# Patient Record
Sex: MALE | Race: WHITE | NOT HISPANIC OR LATINO | Employment: FULL TIME | URBAN - METROPOLITAN AREA
[De-identification: names, ages, dates, MRNs, and addresses within clinical notes are randomized per-mention and may not be internally consistent; named-entity substitution may affect disease eponyms.]

---

## 2021-04-05 ENCOUNTER — HOSPITAL ENCOUNTER (EMERGENCY)
Facility: CLINIC | Age: 51
Discharge: HOME | End: 2021-04-06
Attending: EMERGENCY MEDICINE | Admitting: EMERGENCY MEDICINE
Payer: COMMERCIAL

## 2021-04-05 ENCOUNTER — APPOINTMENT (OUTPATIENT)
Dept: GENERAL RADIOLOGY | Facility: CLINIC | Age: 51
End: 2021-04-05
Attending: EMERGENCY MEDICINE
Payer: COMMERCIAL

## 2021-04-05 DIAGNOSIS — U07.1 PNEUMONIA DUE TO 2019 NOVEL CORONAVIRUS: ICD-10-CM

## 2021-04-05 DIAGNOSIS — J12.82 PNEUMONIA DUE TO 2019 NOVEL CORONAVIRUS: ICD-10-CM

## 2021-04-05 DIAGNOSIS — R03.0 ELEVATED BLOOD PRESSURE READING WITHOUT DIAGNOSIS OF HYPERTENSION: ICD-10-CM

## 2021-04-05 LAB
ANION GAP SERPL CALCULATED.3IONS-SCNC: 2 MMOL/L (ref 3–14)
BASOPHILS # BLD AUTO: 0 10E9/L (ref 0–0.2)
BASOPHILS NFR BLD AUTO: 0.5 %
BUN SERPL-MCNC: 19 MG/DL (ref 7–30)
CALCIUM SERPL-MCNC: 8.7 MG/DL (ref 8.5–10.1)
CHLORIDE SERPL-SCNC: 104 MMOL/L (ref 94–109)
CO2 SERPL-SCNC: 30 MMOL/L (ref 20–32)
CREAT SERPL-MCNC: 1.21 MG/DL (ref 0.66–1.25)
DIFFERENTIAL METHOD BLD: ABNORMAL
EOSINOPHIL # BLD AUTO: 0 10E9/L (ref 0–0.7)
EOSINOPHIL NFR BLD AUTO: 0.3 %
ERYTHROCYTE [DISTWIDTH] IN BLOOD BY AUTOMATED COUNT: 12.5 % (ref 10–15)
GFR SERPL CREATININE-BSD FRML MDRD: 69 ML/MIN/{1.73_M2}
GLUCOSE SERPL-MCNC: 106 MG/DL (ref 70–99)
HCT VFR BLD AUTO: 47.5 % (ref 40–53)
HGB BLD-MCNC: 15.1 G/DL (ref 13.3–17.7)
IMM GRANULOCYTES # BLD: 0 10E9/L (ref 0–0.4)
IMM GRANULOCYTES NFR BLD: 0.3 %
LYMPHOCYTES # BLD AUTO: 1.3 10E9/L (ref 0.8–5.3)
LYMPHOCYTES NFR BLD AUTO: 32 %
MCH RBC QN AUTO: 28.6 PG (ref 26.5–33)
MCHC RBC AUTO-ENTMCNC: 31.8 G/DL (ref 31.5–36.5)
MCV RBC AUTO: 90 FL (ref 78–100)
MONOCYTES # BLD AUTO: 0.5 10E9/L (ref 0–1.3)
MONOCYTES NFR BLD AUTO: 12.7 %
NEUTROPHILS # BLD AUTO: 2.1 10E9/L (ref 1.6–8.3)
NEUTROPHILS NFR BLD AUTO: 54.2 %
NRBC # BLD AUTO: 0 10*3/UL
NRBC BLD AUTO-RTO: 0 /100
PLATELET # BLD AUTO: 151 10E9/L (ref 150–450)
POTASSIUM SERPL-SCNC: 4.2 MMOL/L (ref 3.4–5.3)
RBC # BLD AUTO: 5.28 10E12/L (ref 4.4–5.9)
SODIUM SERPL-SCNC: 136 MMOL/L (ref 133–144)
WBC # BLD AUTO: 3.9 10E9/L (ref 4–11)

## 2021-04-05 PROCEDURE — 96361 HYDRATE IV INFUSION ADD-ON: CPT

## 2021-04-05 PROCEDURE — 258N000003 HC RX IP 258 OP 636: Performed by: EMERGENCY MEDICINE

## 2021-04-05 PROCEDURE — 85025 COMPLETE CBC W/AUTO DIFF WBC: CPT | Performed by: EMERGENCY MEDICINE

## 2021-04-05 PROCEDURE — 71045 X-RAY EXAM CHEST 1 VIEW: CPT

## 2021-04-05 PROCEDURE — 250N000013 HC RX MED GY IP 250 OP 250 PS 637: Performed by: EMERGENCY MEDICINE

## 2021-04-05 PROCEDURE — 96374 THER/PROPH/DIAG INJ IV PUSH: CPT

## 2021-04-05 PROCEDURE — 99284 EMERGENCY DEPT VISIT MOD MDM: CPT | Mod: 25

## 2021-04-05 PROCEDURE — 80048 BASIC METABOLIC PNL TOTAL CA: CPT | Performed by: EMERGENCY MEDICINE

## 2021-04-05 PROCEDURE — 250N000011 HC RX IP 250 OP 636: Performed by: EMERGENCY MEDICINE

## 2021-04-05 PROCEDURE — 87636 SARSCOV2 & INF A&B AMP PRB: CPT | Performed by: EMERGENCY MEDICINE

## 2021-04-05 PROCEDURE — C9803 HOPD COVID-19 SPEC COLLECT: HCPCS

## 2021-04-05 RX ORDER — KETOROLAC TROMETHAMINE 15 MG/ML
15 INJECTION, SOLUTION INTRAMUSCULAR; INTRAVENOUS ONCE
Status: COMPLETED | OUTPATIENT
Start: 2021-04-05 | End: 2021-04-05

## 2021-04-05 RX ORDER — ACETAMINOPHEN 500 MG
1000 TABLET ORAL ONCE
Status: COMPLETED | OUTPATIENT
Start: 2021-04-05 | End: 2021-04-05

## 2021-04-05 RX ADMIN — KETOROLAC TROMETHAMINE 15 MG: 15 INJECTION, SOLUTION INTRAMUSCULAR; INTRAVENOUS at 23:57

## 2021-04-05 RX ADMIN — ACETAMINOPHEN 1000 MG: 500 TABLET, FILM COATED ORAL at 23:57

## 2021-04-05 RX ADMIN — SODIUM CHLORIDE 1000 ML: 9 INJECTION, SOLUTION INTRAVENOUS at 23:55

## 2021-04-06 VITALS
RESPIRATION RATE: 18 BRPM | OXYGEN SATURATION: 96 % | SYSTOLIC BLOOD PRESSURE: 155 MMHG | HEART RATE: 97 BPM | TEMPERATURE: 98.6 F | DIASTOLIC BLOOD PRESSURE: 107 MMHG

## 2021-04-06 LAB
FLUAV RNA RESP QL NAA+PROBE: NEGATIVE
FLUBV RNA RESP QL NAA+PROBE: NEGATIVE
LABORATORY COMMENT REPORT: ABNORMAL
RSV RNA SPEC QL NAA+PROBE: ABNORMAL
SARS-COV-2 RNA RESP QL NAA+PROBE: POSITIVE
SPECIMEN SOURCE: ABNORMAL

## 2021-04-06 ASSESSMENT — ENCOUNTER SYMPTOMS
DIARRHEA: 0
MYALGIAS: 1
VOMITING: 0
FEVER: 1
COUGH: 1
SHORTNESS OF BREATH: 1
APPETITE CHANGE: 1

## 2021-04-06 NOTE — DISCHARGE INSTRUCTIONS
Discharge Instructions  COVID-19    COVID-19 is the disease caused by a new coronavirus. The virus spreads from person-to-person primarily by droplets when an infected person coughs or sneezes and the droplet either lands on another person or that other person touches a surface with the droplet on it. There are tests available to diagnose COVID-19. There is no specific treatment or medicine for the disease.    You may have been diagnosed with COVID, may be being tested for COVID and have a pending test result, or may have been exposed to COVID.    Symptoms of COVID-19    Many people have no symptoms or mild symptoms.  Symptoms may usually appear 4 to 5 days (up to 14 days) after contact with a person with COVID-19. Some people will get severe symptoms and pneumonia. Usual symptoms are:     ? Fever  ? Cough  ? Trouble breathing    Less common symptoms are: Headache, body aches, sore throat, sneezing, diarrhea, loss of taste or smell.    Isolation and Quarantine    You were seen because you have symptoms, had an exposure, or had some other concern about possible COVID. The best way to stop the spread of the virus is to avoid contact with others.  Isolation refers to sick people staying away from people who are not sick. A person in quarantine is limiting activity because they were exposed and are waiting to see if they might become sick.    If you test positive for COVID, you should stay home (isolation) for at least 10 days after your symptoms began, and for 24 hours with no fever and improvement of symptoms--whichever is longer. (Your fever should be gone for 24 hours without using fever-reducing medicine). If you have no symptoms, you should stay home (isolation) for 10 days from the day of the test.    For example, if you have a fever and cough for 6 days, you need to stay home 4 more days with no fever for a total of 10 days. Or, if you have a fever and cough for 10 days, you need to stay home one more day with  no fever for a total of 11 days.    If you have a high-risk exposure to COVID (you spent 15 minutes or more within six feet of somebody who has COVID), you should stay home (quarantine) for 14 days. Even if you test negative for COVID, the CDC recommends a 14-day quarantine from the time of your last exposure to that individual. There are options for a shortened (<14 day quarantine) you can review at:    https://www.health.[facility].mn./diseases/coronavirus/close.html#long    If you have symptoms but a negative test, you should stay at home until you are symptom-free and without fever for 24 hours, using the same judgment you would for when it is safe to return to work/school from strep throat, influenza, or the common cold. If you worsen, you should consider being re-evaluated.    If you are being tested for COVID and your test is pending, you should stay home until you know your test result.    How should I protect myself and others?    Do not go to work or school. Have a friend or relative do your shopping. Do not use public transportation (bus, train) or ridesharing (Lyft, Uber).    Separate yourself from other people in your home. As much as possible, you should stay in one room and away from other people in your home. Also, use a separate bathroom, if possible. Avoid handling pets or other animals while sick.     Wear a facemask if you need to be around other people and cover your mouth and nose with a tissue when you cough or sneeze.     Avoid sharing personal household items. You should not share dishes, drinking glasses, forks/knives/spoons, towels, or bedding with other people in your home. After using these items, they should be washed with soap and water. Clean parts of your home that are touched often (doorknobs, faucets, countertops, etc.) daily.     Wash your hands often with soap and water for at least 20 seconds or use an alcohol-based hand  containing at least 60% alcohol.     Avoid touching  your face.    Treat your symptoms. You can take Acetaminophen (Tylenol) to treat body aches and fever as needed for comfort. Ibuprofen (Advil or Motrin) can be used as well if you still have symptoms after taking Tylenol. Drink fluids. Rest.    Watch for worsening symptoms such as shortness of breath/difficulty breathing or very severe weakness.    Employers/workplaces are being asked by the Centers for Disease Control (CDC) to not request notes/documentation for you to return to work or prove that you were ill. You may choose to show your employer this paperwork. Also, repeat testing should not be required to return to work.    Exercise/Sports in rare cases, COVID could affect your heart in a way that makes exercise or participation in sports dangerous.  If you have a mild COVID illness (fever, cough, sore throat, and similar symptoms but no difficulty breathing or abnormalities of the lung): After your COVID symptoms have resolved, wait 14-days before returning to activity.  If you have more than a mild illness (meaning that you have problems with your breathing or lungs) or if you participate in competitive or strenuous activity or have a history of heart disease: Please see your primary doctor/provider prior to return to activity/competition.    Return to the Emergency Department if:    If you are developing worsening breathing, shortness of breath, or feel worse you should seek medical attention.  If you are uncertain, contact your health care provider/clinic. If you need emergency medical attention, call 911 and tell them you have been ill.     Discharge Instructions  Hypertension - High Blood Pressure    During you visit to the Emergency Department, your blood pressure was higher than the recommended blood pressure.  This may be related to stress, pain, medication or other temporary conditions. In these cases, your blood pressure may return to normal on its own. If you have a history of high blood pressure,  you may need to have your provider adjust your medications. Sometimes, your high measurement here may indicate that you have developed high blood pressure that will stay high unless it is treated. As a general rule, high blood pressure causes problems over years rather than days, weeks, or months. So, while it is important to treat blood pressure, it is rarely important to treat blood pressure immediately. Occasionally we will begin a medication in the Emergency Department; more often we will recommend close follow-up for medications with a primary doctor/clinic.    Generally, every Emergency Department visit should have a follow-up clinic visit with either a primary or a specialty clinic/provider. Please follow-up as instructed by your emergency provider today.    Return to the Emergency Department if you start to have:  A severe headache.  Chest pain.  Shortness of breath.  Weakness or numbness that affects one part of the body.  Confusion.  Vision changes.  Significant swelling of legs and/or eyes.  A reaction to any medication started in the Emergency Department.    What can I do to help myself?  Avoid alcohol.  Take any blood pressure medicine that you are prescribed.  Get a good night s sleep.  Lower your salt intake.  Exercise.  Lose weight.  Manage stress.  See your doctor regularly    If blood pressure medication was started in the Emergency Department:  The medicine may not have an immediate effect. The body and brain determine what blood pressure you have. The medicine s job is to retrain the body s  thermostat  to a lower blood pressure.  You will need to follow up with your provider to see how this medicine is working for you.  If you were given a prescription for medicine here today, be sure to read all of the information (including the package insert) that comes with your prescription.  This will include important information about the medicine, its side effects, and any warnings that you need to know  about.  The pharmacist who fills the prescription can provide more information and answer questions you may have about the medicine.  If you have questions or concerns that the pharmacist cannot address, please call or return to the Emergency Department.   Remember that you can always come back to the Emergency Department if you are not able to see your regular provider in the amount of time listed above, if you get any new symptoms, or if there is anything that worries you.

## 2021-04-06 NOTE — ED PROVIDER NOTES
History   Chief Complaint:  Fever       The history is provided by the patient.      Jassi Bernardo is a 51 year old male healthy at baseline who presents with concerns for fever, cough and shortness of breath in the setting of exposure to multiple household members with COVID-19. He reports his symptoms began about one week ago with congestion. He then developed fevers up to 103F, cough, shortness of breath, body aches. He notes he has been drinking fluids but feels dehydrated. He has not eaten as much as typical. He denies vomiting or diarrhea. He denies chest pain but notes it feels like it is pounding fast, noting that despite that sensation his apple watch shows his heart rate in the 80s. He last took tylenol at 5pm. He took ibuprofen at 3 pm.      Review of Systems   Constitutional: Positive for appetite change and fever.   HENT: Positive for congestion.    Respiratory: Positive for cough and shortness of breath.    Cardiovascular: Negative for chest pain.   Gastrointestinal: Negative for diarrhea and vomiting.   Musculoskeletal: Positive for myalgias.   All other systems reviewed and are negative.      Allergies:  The patient has no known allergies.     Medications:  The patient is currently on no regular medications.    Past Medical History:    Amblyopia  Anxiety     Family History:    Father: colitis    Social History:  Patient presents to the ED with his wife.  Nonsmoker    Physical Exam     Patient Vitals for the past 24 hrs:   BP Temp Temp src Pulse Resp SpO2   04/06/21 0046 (!) 155/107 -- -- -- -- 96 %   04/05/21 1949 (!) 191/108 98.6  F (37  C) Oral 97 18 96 %       Physical Exam  General: Adult male sitting upright  Eyes: PERRL, Conjunctive within normal limits  ENT: Moist mucous membranes, oropharynx clear.   CV: Normal S1S2, no murmur, rub or gallop. Regular rate and rhythm  Resp: Clear to auscultation bilaterally, no wheezes, rales or rhonchi. Normal respiratory effort.  GI: Abdomen is soft,  nontender and nondistended. No palpable masses. No rebound or guarding.  MSK: Trace edema at the sockline bilateral lower extremities. Nontender. Normal active range of motion.  Skin: Warm and dry. No rashes or lesions or ecchymoses on visible skin.  Neuro: Alert and oriented. Responds appropriately to all questions and commands. No focal findings appreciated. Normal muscle tone.  Psych: Normal mood and affect. Pleasant.    Emergency Department Course     Imaging:  XR Chest 1 View  Patchy infiltrates of both lungs with upper lobe predominance consistent with bilateral pneumonia. No pleural fluid or pneumothorax. Normal heart size and pulmonary vascularity.  Reading per radiology.     Laboratory:  CBC: WBC: 3.9 (L), HGB: 15.1, PLT: 151    BMP: Glucose 106 (H), Anion Gap: 2 (L), o/w WNL (Creatinine: 1.21)    Symptomatic Influenza A/B antigen & COVID-19 PCR: Positive    Emergency Department Course:    Reviewed:  I reviewed nursing notes, vitals, past medical history and care everywhere    Assessments:  2325 I obtained history and examined the patient as noted above.   0037 I rechecked the patient. Patient is feeling improved.     Interventions:  2355 NS 1L IV  2357 Tylenol 1000 mg PO  2357 Toradol 15 mg IV    Disposition:  The patient was discharged to home.       Impression & Plan     CMS Diagnoses: None    Medical Decision Making:  Jassi Bernardo is a 51 year old male presents with symptoms that are concerning for COVID-19.  The patient is not hypoxic.  His blood pressure is elevated without known diagnosis of hypertension.  He did improve slightly over time but was still elevated.  He is aware of this and the need for follow-up with his PCP for further discussion.  He has evidence of Covid pneumonia on x-ray with a positive Covid test here.  I do not suspect bacterial cause for symptoms today.  There is no indication for antibiotics.  He did not exhibit any extra work of breathing, had normal mentation, and I do  not believe that the patient requires hospitalization at this time.  He had no hypoxia with ambulation.  The patient does not currently meet criteria for hospitalization.  Patient given [facility] guidelines for home isolation and will follow up with PCP. Patient will drink plenty of fluids, take anti-pyretics and OTC decongestants as needed. Patient asked to return for severe difficulty breathing or new onset chest pain or other worrisome concerns.     Covid-19  Jassi Bernardo was evaluated during a global COVID-19 pandemic, which necessitated consideration that the patient might be at risk for infection with the SARS-CoV-2 virus that causes COVID-19.   Applicable protocols for evaluation were followed during the patient's care.   COVID-19 was considered as part of the patient's evaluation. The plan for testing is:  a test was obtained during this visit.      Diagnosis:    ICD-10-CM    1. Pneumonia due to 2019 novel coronavirus  U07.1     J12.82    2. Elevated blood pressure reading without diagnosis of hypertension  R03.0        Scribe Disclosure:  I, Torie Villalobos, am serving as a scribe at 11:35 PM on 4/5/2021 to document services personally performed by Sylvia Diaz MD based on my observations and the provider's statements to me.            Sylvia Diaz MD  04/06/21 0413       Sylvia Diaz MD  04/06/21 0413

## 2021-04-06 NOTE — ED TRIAGE NOTES
Here for fever(100F-103F) ongoing for past 5 days associated with chills and muscle aches. Today, developed coughing, palpitations, sob, and extremities tingling sensation. Wife and child tested positive for covid. Last took tylenol at 5pm. ABCs intact.

## 2021-04-07 ENCOUNTER — HOSPITAL ENCOUNTER (EMERGENCY)
Facility: CLINIC | Age: 51
Discharge: OTHER INSTITUTION | End: 2021-04-08
Attending: EMERGENCY MEDICINE | Admitting: EMERGENCY MEDICINE
Payer: COMMERCIAL

## 2021-04-07 ENCOUNTER — APPOINTMENT (OUTPATIENT)
Dept: GENERAL RADIOLOGY | Facility: CLINIC | Age: 51
End: 2021-04-07
Attending: EMERGENCY MEDICINE
Payer: COMMERCIAL

## 2021-04-07 ENCOUNTER — PATIENT OUTREACH (OUTPATIENT)
Dept: CARE COORDINATION | Facility: CLINIC | Age: 51
End: 2021-04-07

## 2021-04-07 DIAGNOSIS — J12.82 PNEUMONIA DUE TO 2019 NOVEL CORONAVIRUS: ICD-10-CM

## 2021-04-07 DIAGNOSIS — U07.1 PNEUMONIA DUE TO 2019 NOVEL CORONAVIRUS: ICD-10-CM

## 2021-04-07 DIAGNOSIS — U07.1 2019 NOVEL CORONAVIRUS DISEASE (COVID-19): Primary | ICD-10-CM

## 2021-04-07 LAB
ALBUMIN SERPL-MCNC: 3.3 G/DL (ref 3.4–5)
ALP SERPL-CCNC: 60 U/L (ref 40–150)
ALT SERPL W P-5'-P-CCNC: 98 U/L (ref 0–70)
ANION GAP SERPL CALCULATED.3IONS-SCNC: 4 MMOL/L (ref 3–14)
AST SERPL W P-5'-P-CCNC: 105 U/L (ref 0–45)
BASE EXCESS BLDV CALC-SCNC: 5.1 MMOL/L
BASOPHILS # BLD AUTO: 0 10E9/L (ref 0–0.2)
BASOPHILS NFR BLD AUTO: 0.4 %
BILIRUB SERPL-MCNC: 0.3 MG/DL (ref 0.2–1.3)
BUN SERPL-MCNC: 13 MG/DL (ref 7–30)
CALCIUM SERPL-MCNC: 9.3 MG/DL (ref 8.5–10.1)
CHLORIDE SERPL-SCNC: 100 MMOL/L (ref 94–109)
CO2 SERPL-SCNC: 29 MMOL/L (ref 20–32)
CREAT SERPL-MCNC: 1.06 MG/DL (ref 0.66–1.25)
DIFFERENTIAL METHOD BLD: NORMAL
EOSINOPHIL # BLD AUTO: 0 10E9/L (ref 0–0.7)
EOSINOPHIL NFR BLD AUTO: 0 %
ERYTHROCYTE [DISTWIDTH] IN BLOOD BY AUTOMATED COUNT: 12.3 % (ref 10–15)
GFR SERPL CREATININE-BSD FRML MDRD: 81 ML/MIN/{1.73_M2}
GLUCOSE SERPL-MCNC: 105 MG/DL (ref 70–99)
HCO3 BLDV-SCNC: 30 MMOL/L (ref 21–28)
HCT VFR BLD AUTO: 46.8 % (ref 40–53)
HGB BLD-MCNC: 15.7 G/DL (ref 13.3–17.7)
IMM GRANULOCYTES # BLD: 0 10E9/L (ref 0–0.4)
IMM GRANULOCYTES NFR BLD: 0.4 %
LACTATE BLD-SCNC: 0.4 MMOL/L (ref 0.7–2)
LYMPHOCYTES # BLD AUTO: 0.8 10E9/L (ref 0.8–5.3)
LYMPHOCYTES NFR BLD AUTO: 15.9 %
MAGNESIUM SERPL-MCNC: 1.8 MG/DL (ref 1.6–2.3)
MCH RBC QN AUTO: 28.8 PG (ref 26.5–33)
MCHC RBC AUTO-ENTMCNC: 33.5 G/DL (ref 31.5–36.5)
MCV RBC AUTO: 86 FL (ref 78–100)
MONOCYTES # BLD AUTO: 0.3 10E9/L (ref 0–1.3)
MONOCYTES NFR BLD AUTO: 6.9 %
NEUTROPHILS # BLD AUTO: 3.8 10E9/L (ref 1.6–8.3)
NEUTROPHILS NFR BLD AUTO: 76.4 %
NRBC # BLD AUTO: 0 10*3/UL
NRBC BLD AUTO-RTO: 0 /100
O2/TOTAL GAS SETTING VFR VENT: ABNORMAL %
OXYHGB MFR BLDV: 50 %
PCO2 BLDV: 45 MM HG (ref 40–50)
PH BLDV: 7.44 PH (ref 7.32–7.43)
PLATELET # BLD AUTO: 170 10E9/L (ref 150–450)
PO2 BLDV: 26 MM HG (ref 25–47)
POTASSIUM SERPL-SCNC: 4.6 MMOL/L (ref 3.4–5.3)
PROT SERPL-MCNC: 7.9 G/DL (ref 6.8–8.8)
RBC # BLD AUTO: 5.46 10E12/L (ref 4.4–5.9)
SODIUM SERPL-SCNC: 133 MMOL/L (ref 133–144)
TROPONIN I SERPL-MCNC: <0.015 UG/L (ref 0–0.04)
WBC # BLD AUTO: 4.9 10E9/L (ref 4–11)

## 2021-04-07 PROCEDURE — 99285 EMERGENCY DEPT VISIT HI MDM: CPT | Mod: 25

## 2021-04-07 PROCEDURE — 96361 HYDRATE IV INFUSION ADD-ON: CPT

## 2021-04-07 PROCEDURE — 258N000003 HC RX IP 258 OP 636: Performed by: EMERGENCY MEDICINE

## 2021-04-07 PROCEDURE — 80053 COMPREHEN METABOLIC PANEL: CPT | Performed by: EMERGENCY MEDICINE

## 2021-04-07 PROCEDURE — 82805 BLOOD GASES W/O2 SATURATION: CPT | Performed by: EMERGENCY MEDICINE

## 2021-04-07 PROCEDURE — 71045 X-RAY EXAM CHEST 1 VIEW: CPT

## 2021-04-07 PROCEDURE — 84484 ASSAY OF TROPONIN QUANT: CPT | Performed by: EMERGENCY MEDICINE

## 2021-04-07 PROCEDURE — 85025 COMPLETE CBC W/AUTO DIFF WBC: CPT | Performed by: EMERGENCY MEDICINE

## 2021-04-07 PROCEDURE — 83605 ASSAY OF LACTIC ACID: CPT | Performed by: EMERGENCY MEDICINE

## 2021-04-07 PROCEDURE — 96374 THER/PROPH/DIAG INJ IV PUSH: CPT

## 2021-04-07 PROCEDURE — 250N000011 HC RX IP 250 OP 636: Performed by: EMERGENCY MEDICINE

## 2021-04-07 PROCEDURE — 83735 ASSAY OF MAGNESIUM: CPT | Performed by: EMERGENCY MEDICINE

## 2021-04-07 PROCEDURE — 93005 ELECTROCARDIOGRAM TRACING: CPT

## 2021-04-07 RX ORDER — DEXAMETHASONE SODIUM PHOSPHATE 10 MG/ML
10 INJECTION, SOLUTION INTRAMUSCULAR; INTRAVENOUS ONCE
Status: COMPLETED | OUTPATIENT
Start: 2021-04-07 | End: 2021-04-07

## 2021-04-07 RX ADMIN — SODIUM CHLORIDE 1000 ML: 9 INJECTION, SOLUTION INTRAVENOUS at 21:34

## 2021-04-07 RX ADMIN — DEXAMETHASONE SODIUM PHOSPHATE 10 MG: 10 INJECTION, SOLUTION INTRAMUSCULAR; INTRAVENOUS at 22:12

## 2021-04-07 ASSESSMENT — ENCOUNTER SYMPTOMS
NAUSEA: 1
FEVER: 0
MYALGIAS: 1
SHORTNESS OF BREATH: 1
COUGH: 1

## 2021-04-07 NOTE — PROGRESS NOTES
Clinic Care Coordination Contact  [facility]/Voicemail       Clinical Data: Care Coordinator Outreach  Outreach attempted x 1.  Left message on patient's voicemail with call back information and requested return call.  Plan: Care Coordinator will try to reach patient again in 1-2 business days.

## 2021-04-08 ENCOUNTER — PATIENT OUTREACH (OUTPATIENT)
Dept: CARE COORDINATION | Facility: CLINIC | Age: 51
End: 2021-04-08

## 2021-04-08 VITALS
OXYGEN SATURATION: 96 % | TEMPERATURE: 98.4 F | DIASTOLIC BLOOD PRESSURE: 119 MMHG | SYSTOLIC BLOOD PRESSURE: 179 MMHG | HEART RATE: 86 BPM | RESPIRATION RATE: 23 BRPM

## 2021-04-08 LAB — INTERPRETATION ECG - MUSE: NORMAL

## 2021-04-08 NOTE — PROGRESS NOTES
Clinic Care Coordination Contact  Community Health Worker Initial Outreach       The CHW reviewed the chart and the patient is in patient at [facility] at this time. The CHW will monitor the chart with the [facility] system for a discharge.

## 2021-04-08 NOTE — ED TRIAGE NOTES
Pt arrives via EMS from home for evaluation of respiratory distress r/t covid. Pt seen yesterday dx with covid. Pt having difficulty breathing, body aches, abdominal pain and hypertension. Pt 85% on room air upon arrival. Tylenol last at 2000, Ibuprofen at 1900.

## 2021-04-08 NOTE — ED PROVIDER NOTES
"  History   Chief Complaint:  Shortness of Breath    The history is provided by the patient and medical records.     Jassi Bernardo is a 51 year old male who presents via EMS for evaluation of worsening shortness of breath in the setting of COVID-19. His family tested positive for COVID-19 approximately 1.5 weeks ago; he was asymptomatic and not tested. One week ago, however, he reports starting to develop congestion and sinus concerns, consistent with his typical \"head cold\". These symptoms progressed and he also developed fevers (T-max 103 F), myalgias, cough, palpitations, and dyspnea. Two days ago, he presented to the ED for evaluation of his worsening symptoms; there, he tested positive for COVID, but his work up was otherwise reassuring, he maintained oxygen levels of 96%, and he was deemed stable for discharge. Since then, he has continued use of Tylenol and ibuprofen at home (last dose of Tylenol at 2000) and his fevers have broken. However, his dyspnea has only worsened. Tonight, his breathing was so bad that 911 was called to his home. On arrival, he was found to be hypoxic to the 80s. Here, he continues to report the generalized body aches, dyspnea, and nausea.     From ED Encounter - 4/5/21:   XR Chest 1 View  Patchy infiltrates of both lungs with upper lobe predominance consistent with bilateral pneumonia. No pleural fluid or pneumothorax. Normal heart size and pulmonary vascularity. Reading per radiology.      CBC: WBC: 3.9 (L), HGB: 15.1, PLT: 151  BMP: Glucose 106 (H), Anion Gap: 2 (L), o/w WNL (Creatinine: 1.21)  Symptomatic Influenza A/B antigen & COVID-19 PCR: Positive for COVID-19    Allergies:  No Known Allergies    Medications:    The patient is currently on no regular medications.    Past Medical History:    Anxiety   Hypertension    Past Surgical History:    He denies past surgical history.     Family History:    Father: Colitis     Social History:  Presents with EMS   He is . "     Review of Systems   Constitutional: Negative for fever.   Respiratory: Positive for cough and shortness of breath.    Cardiovascular: Negative for chest pain.   Gastrointestinal: Positive for nausea.   Musculoskeletal: Positive for myalgias.   All other systems reviewed and are negative.    Physical Exam     Patient Vitals for the past 24 hrs:   BP Temp Temp src Pulse Resp SpO2   04/07/21 2310 -- -- -- 105 28 95 %   04/07/21 2305 -- -- -- 94 24 95 %   04/07/21 2300 (!) 176/111 -- -- 99 25 95 %   04/07/21 2255 -- -- -- 113 18 95 %   04/07/21 2250 -- -- -- 100 24 95 %   04/07/21 2245 -- -- -- 107 24 95 %   04/07/21 2240 (!) 175/110 -- -- 99 28 95 %   04/07/21 2235 -- -- -- 104 28 95 %   04/07/21 2230 -- -- -- 99 27 96 %   04/07/21 2225 -- -- -- 106 28 95 %   04/07/21 2220 (!) 174/108 -- -- 100 30 96 %   04/07/21 2215 -- -- -- 93 18 96 %   04/07/21 2210 -- -- -- 97 23 94 %   04/07/21 2205 -- -- -- 94 26 95 %   04/07/21 2200 (!) 178/113 -- -- 93 28 95 %   04/07/21 2155 -- -- -- 101 29 96 %   04/07/21 2150 -- -- -- 97 29 95 %   04/07/21 2145 -- -- -- 99 26 96 %   04/07/21 2140 (!) 185/119 -- -- 97 23 94 %   04/07/21 2135 -- -- -- 94 25 95 %   04/07/21 2130 -- -- -- 101 (!) 33 95 %   04/07/21 2125 -- -- -- 101 23 94 %   04/07/21 2120 (!) 179/118 -- -- 98 24 96 %   04/07/21 2115 -- -- -- 96 15 96 %   04/07/21 2110 -- -- -- 96 15 96 %   04/07/21 2105 -- -- -- 92 17 95 %   04/07/21 2100 (!) 182/120 -- -- 102 21 95 %   04/07/21 2055 -- -- -- 92 28 96 %   04/07/21 2050 -- -- -- 95 (!) 35 96 %   04/07/21 2045 (!) 189/121 -- -- 92 22 94 %   04/07/21 2038 (!) 183/114 98.4  F (36.9  C) Oral -- -- --   04/07/21 2037 -- -- -- -- 30 (!) 85 %   04/07/21 2035 (!) 183/114 -- -- 101 -- (!) 87 %        Physical Exam  General: Patient is awake, alert and interactive when I enter the room  Head: The scalp, face, and head appear normal  Eyes: The pupils are equal, round, and reactive to light. Conjunctivae and sclerae are normal  ENT:  External acoustic canals are normal. The oropharynx is normal without erythema. Uvula is in the midline  Neck: Normal range of motion.   CV: Tachycardia  Resp: Mild tachypnea but lungs are clear without wheezes or rales. No respiratory distress.   GI: Abdomen is soft, no rigidity, guarding, or rebound. No distension. No tenderness to palpation in any quadrant.     MS: Normal tone. Joints grossly normal without effusions. No asymmetric leg swelling, calf or thigh tenderness.    Skin: No rash or lesions noted. Normal capillary refill noted  Neuro: Speech is normal and fluent. Face is symmetric. Moving all extremities.   Psych:  Normal affect.  Appropriate interactions.    Emergency Department Course     Imaging:  XR Chest Port 1 View:  Shallow inspiration. Worsening infiltrates in the right upper lobe and left midlung. Probable right hilar lymphadenopathy. Shallow inspiration. Reading per radiology.      Laboratory:  CBC: WBC 4.9, HGB: 15.7, PLT: 170  CMP: Pending    Magnesium: Pending    Troponin (Collected at 2041): <0.015     Lactic acid (Resulted at 2223): 0.4 (L)  Blood gas venous and oxyhgb: pH: 7.44 (H), Bicarb: 30 (H), o/w WNL (on 2L)    Emergency Department Course:    Reviewed:  I reviewed the patient's nursing notes, vitals, past medical records, and Care Everywhere.     Assessments:  2111: I performed an exam of the patient, as documented above. History obtained and plan for ED work up discussed as well. He is currently on 3 L NC. Discussed plan for admission.   2141: I reassessed the patient's status and discussed the results of the work up.   2150: I updated the patient with [facility]; he is amenable to transfer to any hospital.     Consults:   2300: I consulted with Dr. Hamilton of the hospitalist services at [facility]: They are in agreement to accept the patient for transfer.    Interventions:  2134: NS 1L IV bolus   2212: Decadron, 10  mg, IV injection    Disposition:  The patient was transferred to [facility] via EMS. Dr. Hamilton accepted the patient for transfer.     Impression & Plan      Covid-19  Jassi Bernardo was evaluated during a global COVID-19 pandemic, which necessitated consideration that the patient might be at risk for infection with the SARS-CoV-2 virus that causes COVID-19.   Applicable protocols for evaluation were followed during the patient's care.   COVID-19 was considered as part of the patient's evaluation. The plan for testing is:  a test was obtained at a previous visit and reviewed & considered today.    Medical Decision Making:   Jassi Bernardo is a 51 year old male who presents with shortness of breath and cough.  Patient with initial symptoms beginning 8 days ago and was evaluated in our emergency department 2 days ago with reassuring work-up returns here today with worsening shortness of breath.  Patient was found to be acutely hypoxic to 85% in triage and was started on 3 L of nasal cannula with good stabilization of his respiratory status.  He displays some mild tachypnea but overall his work of breathing is improved.  At this point do not believe he requires BiPAP.  Patient was started on Decadron.  The remainder of his work-up was reassuring.  Given the patient's Covid diagnosis and hypoxia he will require admission for further evaluation and treatment.  Unfortunately there are no Covid beds available at [facility], [facility] or the [facility] thus he will be transferred to [facility] for further evaluation and treatment.    Diagnosis:     ICD-10-CM    1. Pneumonia due to 2019 novel coronavirus  U07.1     J12.82        Scribe Disclosure:  I, Ramila Munson, am serving as a scribe on 4/7/2021 at 9:42 PM to personally document services performed by Marcelino Bishop MD based on my observations and the provider's statements to me.      4/7/2021   EMERGENCY DEPARTMENT      Marcelino Bishop MD  04/07/21 4122

## 2021-04-09 NOTE — PROGRESS NOTES
Clinic Care Coordination Contact  Community Health Worker Initial Outreach       The patient is currently in patient at [facility]. The CHW will monitor the chart for discharge.

## 2021-04-12 NOTE — PROGRESS NOTES
Clinic Care Coordination Contact      4/12- Patient remains admitted at [facility]. Per [facility] chart, patient is anticipated to discharge home with family support and no additional services or support needs. Patient has been counseled to establish PCP follow up and understands it can be at his clinic of choice. Will cancel referral from [facility].

## 2021-04-14 ENCOUNTER — COMMUNICATION - HEALTHEAST (OUTPATIENT)
Dept: NURSING | Facility: CLINIC | Age: 51
End: 2021-04-14

## 2021-04-14 ENCOUNTER — AMBULATORY - HEALTHEAST (OUTPATIENT)
Dept: NURSING | Facility: CLINIC | Age: 51
End: 2021-04-14

## 2021-04-14 DIAGNOSIS — U07.1 2019 NOVEL CORONAVIRUS DISEASE (COVID-19): ICD-10-CM

## 2021-05-17 NOTE — ED NOTES
Bed: ED09  Expected date: 4/7/21  Expected time: 8:21 PM  Means of arrival: Ambulance  Comments:  BV3-covid/resp.  
DISPLAY PLAN FREE TEXT

## 2021-06-16 PROBLEM — E66.01 SEVERE OBESITY (H): Chronic | Status: ACTIVE | Noted: 2021-04-08

## 2021-06-16 PROBLEM — J12.82 PNEUMONIA DUE TO COVID-19 VIRUS: Status: ACTIVE | Noted: 2021-04-08

## 2021-06-16 PROBLEM — U07.1 COVID-19: Status: ACTIVE | Noted: 2021-04-08

## 2021-06-16 PROBLEM — U07.1 PNEUMONIA DUE TO COVID-19 VIRUS: Status: ACTIVE | Noted: 2021-04-08

## 2021-06-16 NOTE — PROGRESS NOTES
Clinic Care Coordination Contact    Clinic Care Coordination Contact     Follow Up Progress Note      Reason for Referral:      Intervention/Education provided during outreach: CHW spoke with patient. Reviewed Hospital COVID discharge instructions. Patient DID NOT accept assistance from CHW to schedule PCP follow up appointment with PCP at [facility].      Plan: Patient let CHW know that he will check with his insurance company to see what HC system is in network for him and call and schedule an appointment.     No further Outreach will be done at this time.

## 2023-11-08 ENCOUNTER — APPOINTMENT (OUTPATIENT)
Dept: ULTRASOUND IMAGING | Facility: CLINIC | Age: 53
End: 2023-11-08
Attending: EMERGENCY MEDICINE
Payer: COMMERCIAL

## 2023-11-08 ENCOUNTER — HOSPITAL ENCOUNTER (EMERGENCY)
Facility: CLINIC | Age: 53
Discharge: HOME | End: 2023-11-08
Attending: EMERGENCY MEDICINE | Admitting: EMERGENCY MEDICINE
Payer: COMMERCIAL

## 2023-11-08 VITALS
OXYGEN SATURATION: 96 % | TEMPERATURE: 98.3 F | DIASTOLIC BLOOD PRESSURE: 106 MMHG | RESPIRATION RATE: 18 BRPM | SYSTOLIC BLOOD PRESSURE: 166 MMHG | HEART RATE: 83 BPM

## 2023-11-08 DIAGNOSIS — I10 HYPERTENSION, UNSPECIFIED TYPE: ICD-10-CM

## 2023-11-08 DIAGNOSIS — I49.1 PAC (PREMATURE ATRIAL CONTRACTION): ICD-10-CM

## 2023-11-08 DIAGNOSIS — R60.0 LEG EDEMA, RIGHT: ICD-10-CM

## 2023-11-08 LAB
ANION GAP SERPL CALCULATED.3IONS-SCNC: 10 MMOL/L (ref 7–15)
ATRIAL RATE - MUSE: 84 BPM
BASOPHILS # BLD AUTO: 0 10E3/UL (ref 0–0.2)
BASOPHILS NFR BLD AUTO: 0 %
BUN SERPL-MCNC: 14.1 MG/DL (ref 6–20)
CALCIUM SERPL-MCNC: 9.7 MG/DL (ref 8.6–10)
CHLORIDE SERPL-SCNC: 99 MMOL/L (ref 98–107)
CREAT SERPL-MCNC: 1.01 MG/DL (ref 0.67–1.17)
DEPRECATED HCO3 PLAS-SCNC: 27 MMOL/L (ref 22–29)
DIASTOLIC BLOOD PRESSURE - MUSE: NORMAL MMHG
EGFRCR SERPLBLD CKD-EPI 2021: 89 ML/MIN/1.73M2
EOSINOPHIL # BLD AUTO: 0.1 10E3/UL (ref 0–0.7)
EOSINOPHIL NFR BLD AUTO: 1 %
ERYTHROCYTE [DISTWIDTH] IN BLOOD BY AUTOMATED COUNT: 12.4 % (ref 10–15)
GLUCOSE SERPL-MCNC: 110 MG/DL (ref 70–99)
HCT VFR BLD AUTO: 49.3 % (ref 40–53)
HGB BLD-MCNC: 16.2 G/DL (ref 13.3–17.7)
HOLD SPECIMEN: NORMAL
HOLD SPECIMEN: NORMAL
IMM GRANULOCYTES # BLD: 0 10E3/UL
IMM GRANULOCYTES NFR BLD: 0 %
INTERPRETATION ECG - MUSE: NORMAL
LYMPHOCYTES # BLD AUTO: 1.9 10E3/UL (ref 0.8–5.3)
LYMPHOCYTES NFR BLD AUTO: 21 %
MCH RBC QN AUTO: 28.4 PG (ref 26.5–33)
MCHC RBC AUTO-ENTMCNC: 32.9 G/DL (ref 31.5–36.5)
MCV RBC AUTO: 86 FL (ref 78–100)
MONOCYTES # BLD AUTO: 0.8 10E3/UL (ref 0–1.3)
MONOCYTES NFR BLD AUTO: 9 %
NEUTROPHILS # BLD AUTO: 6.2 10E3/UL (ref 1.6–8.3)
NEUTROPHILS NFR BLD AUTO: 69 %
NRBC # BLD AUTO: 0 10E3/UL
NRBC BLD AUTO-RTO: 0 /100
P AXIS - MUSE: 66 DEGREES
PLATELET # BLD AUTO: 271 10E3/UL (ref 150–450)
POTASSIUM SERPL-SCNC: 4.3 MMOL/L (ref 3.4–5.3)
PR INTERVAL - MUSE: 136 MS
QRS DURATION - MUSE: 90 MS
QT - MUSE: 372 MS
QTC - MUSE: 439 MS
R AXIS - MUSE: 70 DEGREES
RBC # BLD AUTO: 5.71 10E6/UL (ref 4.4–5.9)
SODIUM SERPL-SCNC: 136 MMOL/L (ref 135–145)
SYSTOLIC BLOOD PRESSURE - MUSE: NORMAL MMHG
T AXIS - MUSE: 52 DEGREES
VENTRICULAR RATE- MUSE: 84 BPM
WBC # BLD AUTO: 9.1 10E3/UL (ref 4–11)

## 2023-11-08 PROCEDURE — 85025 COMPLETE CBC W/AUTO DIFF WBC: CPT | Performed by: EMERGENCY MEDICINE

## 2023-11-08 PROCEDURE — 99285 EMERGENCY DEPT VISIT HI MDM: CPT | Mod: 25

## 2023-11-08 PROCEDURE — 36415 COLL VENOUS BLD VENIPUNCTURE: CPT | Performed by: EMERGENCY MEDICINE

## 2023-11-08 PROCEDURE — 93005 ELECTROCARDIOGRAM TRACING: CPT

## 2023-11-08 PROCEDURE — 82310 ASSAY OF CALCIUM: CPT | Performed by: EMERGENCY MEDICINE

## 2023-11-08 PROCEDURE — 93971 EXTREMITY STUDY: CPT | Mod: RT

## 2023-11-08 ASSESSMENT — ACTIVITIES OF DAILY LIVING (ADL)
ADLS_ACUITY_SCORE: 33
ADLS_ACUITY_SCORE: 35

## 2023-11-08 NOTE — ED PROVIDER NOTES
History     Chief Complaint:  Leg Pain     HPI   Jassi Bernardo is a 53 year old male with history of hypertension who presents with right lower extremity edema for the past week.  He occasionally has pain in the right calf as well.  He denies any known injuries.  He denies any fevers or redness.  Denies any chest pain or shortness of breath.  He has not had this in the past.  He also notes that his blood pressure has been running high recently.  He does not routinely take blood pressure medication but did have some leftover which he was taking this week.  He denies any chest pain, shortness of breath, abdominal pain, vomiting, diarrhea.  He has noted intermittent palpitations however.  No recent surgeries, hospitalizations, immobilization.      Independent Historian:   None - Patient Only    Review of External Notes:   None      Medications:    None    Past Medical History:    Obesity  Hypertension    Physical Exam   Patient Vitals for the past 24 hrs:   BP Temp Temp src Pulse Resp SpO2   11/08/23 1335 (!) 166/106 -- -- 83 18 96 %   11/08/23 0921 (!) 192/120 98.3  F (36.8  C) Temporal 87 16 99 %        Physical Exam  Vitals and nursing note reviewed.   Constitutional:       General: He is not in acute distress.     Appearance: Normal appearance. He is not ill-appearing.   HENT:      Head: Normocephalic and atraumatic.      Right Ear: External ear normal.      Left Ear: External ear normal.      Nose: Nose normal.   Eyes:      Conjunctiva/sclera: Conjunctivae normal.   Cardiovascular:      Rate and Rhythm: Normal rate and regular rhythm.      Pulses: Normal pulses.      Heart sounds: No murmur heard.  Pulmonary:      Effort: Pulmonary effort is normal. No respiratory distress.      Breath sounds: No wheezing, rhonchi or rales.   Abdominal:      General: Abdomen is flat. There is no distension.      Palpations: Abdomen is soft.      Tenderness: There is no abdominal tenderness. There is no guarding or rebound.    Musculoskeletal:         General: Swelling present. No deformity.      Cervical back: Normal range of motion and neck supple.      Right lower leg: Edema present.   Skin:     General: Skin is warm and dry.      Findings: No rash.   Neurological:      Mental Status: He is alert and oriented to person, place, and time.   Psychiatric:         Behavior: Behavior normal.           Emergency Department Course   ECG  ECG results from 11/08/23   EKG 12-lead, tracing only     Value    Systolic Blood Pressure     Diastolic Blood Pressure     Ventricular Rate 84    Atrial Rate 84    CO Interval 136    QRS Duration 90        QTc 439    P Axis 66    R AXIS 70    T Axis 52    Interpretation ECG      Sinus rhythm with Premature supraventricular complexes  Otherwise normal ECG  When compared with ECG of 07-APR-2021 23:34,  Premature supraventricular complexes are now Present  Confirmed by - EMERGENCY ROOM, PHYSICIAN ([id number]),  RICHI HI (Kenneth) on 11/8/2023 12:10:26 PM         Imaging:  US Lower Extremity Venous Duplex Right   Final Result   IMPRESSION: No evidence of deep venous thrombosis.      BRANDI PAYNE MD            SYSTEM ID:  [id number]         Laboratory:  Labs Ordered and Resulted from Time of ED Arrival to Time of ED Departure   BASIC METABOLIC PANEL - Abnormal       Result Value    Sodium 136      Potassium 4.3      Chloride 99      Carbon Dioxide (CO2) 27      Anion Gap 10      Urea Nitrogen 14.1      Creatinine 1.01      GFR Estimate 89      Calcium 9.7      Glucose 110 (*)    CBC WITH PLATELETS AND DIFFERENTIAL    WBC Count 9.1      RBC Count 5.71      Hemoglobin 16.2      Hematocrit 49.3      MCV 86      MCH 28.4      MCHC 32.9      RDW 12.4      Platelet Count 271      % Neutrophils 69      % Lymphocytes 21      % Monocytes 9      % Eosinophils 1      % Basophils 0      % Immature Granulocytes 0      NRBCs per 100 WBC 0      Absolute Neutrophils 6.2      Absolute Lymphocytes 1.9      Absolute  Monocytes 0.8      Absolute Eosinophils 0.1      Absolute Basophils 0.0      Absolute Immature Granulocytes 0.0      Absolute NRBCs 0.0            Emergency Department Course & Assessments:     Interventions:  Medications - No data to display       Independent Interpretation (X-rays, CTs, rhythm strip):  None    Consultations/Discussion of Management or Tests:  None        Social Determinants of Health affecting care:   None    Disposition:  The patient was discharged to home.     Impression & Plan    CMS Diagnoses: None    Medical Decision Makin-year-old male presenting with right lower extremity edema and hypertension.  Differential diagnosis for the right lower extremity edema includes DVT, venous insufficiency, lymphedema, musculoskeletal injury, etc.  The blood pressure seems to be asymptomatic.  Will check EKG and labs to ensure no signs of endorgan damage.  Otherwise we will recommend that he follow-up closely with primary care for blood pressure control.      Diagnosis:    ICD-10-CM    1. Leg edema, right  R60.0       2. Hypertension, unspecified type  I10       3. PAC (premature atrial contraction)  I49.1            Discharge Medications:  There are no discharge medications for this patient.     Scribe Disclosure:  Lora RODRIGUEZ, am serving as a scribe at 10:30 AM on 2023 to document services personally performed by Marilyn Ramesh PA-C based on my observations and the provider's statements to me.     2023   Marilyn Ramesh PA-C Goodwin, Shaun M, MD  [phone]

## 2023-11-08 NOTE — ED TRIAGE NOTES
Pt with c/o R calf pain x3 days. Reports swelling to calf. Denies CP or SOB. No hx DVTs/PEs. ABC intact.